# Patient Record
Sex: FEMALE | Race: WHITE | NOT HISPANIC OR LATINO | Employment: OTHER | URBAN - METROPOLITAN AREA
[De-identification: names, ages, dates, MRNs, and addresses within clinical notes are randomized per-mention and may not be internally consistent; named-entity substitution may affect disease eponyms.]

---

## 2018-01-30 NOTE — PATIENT DISCUSSION
Based on the patient’s desires, recommend patient select the Symfony IOL using laser-assisted surgery and astigmatism treatment after the risks and benefits were discussed, including decreased vision and glare in low light, and may still need glasses for some activities.

## 2018-01-30 NOTE — PATIENT DISCUSSION
Patient's sister had advance iol on [address] and had an iol exchange due to trouble with advance iol.

## 2018-08-15 ENCOUNTER — RETINA CONSULT (OUTPATIENT)
Dept: URBAN - METROPOLITAN AREA CLINIC 39 | Facility: CLINIC | Age: 83
End: 2018-08-15

## 2018-08-15 DIAGNOSIS — H43.813: ICD-10-CM

## 2018-08-15 DIAGNOSIS — H35.3122: ICD-10-CM

## 2018-08-15 DIAGNOSIS — H35.3211: ICD-10-CM

## 2018-08-15 DIAGNOSIS — E11.9: ICD-10-CM

## 2018-08-15 DIAGNOSIS — H35.722: ICD-10-CM

## 2018-08-15 DIAGNOSIS — H35.731: ICD-10-CM

## 2018-08-15 PROCEDURE — 2022F DILAT RTA XM EVC RTNOPTHY: CPT

## 2018-08-15 PROCEDURE — 2019F DILATED MACUL EXAM DONE: CPT

## 2018-08-15 PROCEDURE — G9903 PT SCRN TBCO ID AS NON USER: HCPCS

## 2018-08-15 PROCEDURE — 67028 INJECTION EYE DRUG: CPT

## 2018-08-15 PROCEDURE — 4040F PNEUMOC VAC/ADMIN/RCVD: CPT

## 2018-08-15 PROCEDURE — 92242 FLUORESCEIN&ICG ANGIOGRAPHY: CPT

## 2018-08-15 PROCEDURE — 99204 OFFICE O/P NEW MOD 45 MIN: CPT

## 2018-08-15 PROCEDURE — 3072F LOW RISK FOR RETINOPATHY: CPT

## 2018-08-15 PROCEDURE — 9222550 BILAT EXTENDED OPHTHALMOSCOPY, FIRST

## 2018-08-15 PROCEDURE — G8482 FLU IMMUNIZE ORDER/ADMIN: HCPCS

## 2018-08-15 PROCEDURE — 4177F TALK PT/CRGVR RE AREDS PREV: CPT

## 2018-08-15 PROCEDURE — 92134 CPTRZ OPH DX IMG PST SGM RTA: CPT

## 2018-08-15 PROCEDURE — G8756 NO BP MEASURE DOC: HCPCS

## 2018-08-15 PROCEDURE — G8427 DOCREV CUR MEDS BY ELIG CLIN: HCPCS

## 2018-08-15 PROCEDURE — 1036F TOBACCO NON-USER: CPT

## 2018-08-15 ASSESSMENT — VISUAL ACUITY
OS_PH: 20/200
OD_SC: 20/60
OS_SC: 20/400

## 2018-08-15 ASSESSMENT — TONOMETRY
OS_IOP_MMHG: 8
OD_IOP_MMHG: 10

## 2018-09-13 ENCOUNTER — RETINA CONSULT (OUTPATIENT)
Dept: URBAN - METROPOLITAN AREA CLINIC 35 | Facility: CLINIC | Age: 83
End: 2018-09-13

## 2018-09-13 DIAGNOSIS — H43.813: ICD-10-CM

## 2018-09-13 DIAGNOSIS — H35.722: ICD-10-CM

## 2018-09-13 DIAGNOSIS — H35.3211: ICD-10-CM

## 2018-09-13 DIAGNOSIS — H35.731: ICD-10-CM

## 2018-09-13 DIAGNOSIS — H35.3122: ICD-10-CM

## 2018-09-13 DIAGNOSIS — E11.9: ICD-10-CM

## 2018-09-13 PROCEDURE — 4177F TALK PT/CRGVR RE AREDS PREV: CPT

## 2018-09-13 PROCEDURE — 9222650 BILAT EXTENDED OPHTHALMOSCOPY, F/U

## 2018-09-13 PROCEDURE — G9974 MAC EXAM PERF: HCPCS

## 2018-09-13 PROCEDURE — G9903 PT SCRN TBCO ID AS NON USER: HCPCS

## 2018-09-13 PROCEDURE — 2019F DILATED MACUL EXAM DONE: CPT

## 2018-09-13 PROCEDURE — 3072F LOW RISK FOR RETINOPATHY: CPT

## 2018-09-13 PROCEDURE — 1036F TOBACCO NON-USER: CPT

## 2018-09-13 PROCEDURE — 2022F DILAT RTA XM EVC RTNOPTHY: CPT

## 2018-09-13 PROCEDURE — G8756 NO BP MEASURE DOC: HCPCS

## 2018-09-13 PROCEDURE — G8427 DOCREV CUR MEDS BY ELIG CLIN: HCPCS

## 2018-09-13 PROCEDURE — 92134 CPTRZ OPH DX IMG PST SGM RTA: CPT

## 2018-09-13 PROCEDURE — 67028 INJECTION EYE DRUG: CPT

## 2018-09-13 PROCEDURE — 92012 INTRM OPH EXAM EST PATIENT: CPT

## 2018-09-13 ASSESSMENT — VISUAL ACUITY
OS_SC: CF 5FT
OD_SC: 20/60
OS_PH: 20/400

## 2018-09-13 ASSESSMENT — TONOMETRY
OD_IOP_MMHG: 12
OS_IOP_MMHG: 12

## 2018-10-25 ENCOUNTER — ESTABLISHED PATIENT (OUTPATIENT)
Dept: URBAN - METROPOLITAN AREA CLINIC 35 | Facility: CLINIC | Age: 83
End: 2018-10-25

## 2018-10-25 DIAGNOSIS — H35.731: ICD-10-CM

## 2018-10-25 DIAGNOSIS — E11.9: ICD-10-CM

## 2018-10-25 DIAGNOSIS — H43.813: ICD-10-CM

## 2018-10-25 DIAGNOSIS — H35.3211: ICD-10-CM

## 2018-10-25 DIAGNOSIS — H35.722: ICD-10-CM

## 2018-10-25 DIAGNOSIS — H35.3122: ICD-10-CM

## 2018-10-25 PROCEDURE — 2022F DILAT RTA XM EVC RTNOPTHY: CPT

## 2018-10-25 PROCEDURE — G8482 FLU IMMUNIZE ORDER/ADMIN: HCPCS

## 2018-10-25 PROCEDURE — 2019F DILATED MACUL EXAM DONE: CPT

## 2018-10-25 PROCEDURE — 92134 CPTRZ OPH DX IMG PST SGM RTA: CPT

## 2018-10-25 PROCEDURE — 99214 OFFICE O/P EST MOD 30 MIN: CPT

## 2018-10-25 PROCEDURE — G8428 CUR MEDS NOT DOCUMENT: HCPCS

## 2018-10-25 PROCEDURE — G8756 NO BP MEASURE DOC: HCPCS

## 2018-10-25 PROCEDURE — 4177F TALK PT/CRGVR RE AREDS PREV: CPT

## 2018-10-25 PROCEDURE — G9903 PT SCRN TBCO ID AS NON USER: HCPCS

## 2018-10-25 PROCEDURE — 1036F TOBACCO NON-USER: CPT

## 2018-10-25 PROCEDURE — 67028 INJECTION EYE DRUG: CPT

## 2018-10-25 PROCEDURE — 4040F PNEUMOC VAC/ADMIN/RCVD: CPT

## 2018-10-25 PROCEDURE — 3072F LOW RISK FOR RETINOPATHY: CPT

## 2018-10-25 ASSESSMENT — VISUAL ACUITY
OS_PH: 20/400
OS_SC: CF 5FT
OD_SC: 20/50-2

## 2018-10-25 ASSESSMENT — TONOMETRY
OD_IOP_MMHG: 14
OS_IOP_MMHG: 13

## 2018-12-20 ENCOUNTER — ESTABLISHED PATIENT (OUTPATIENT)
Dept: URBAN - METROPOLITAN AREA CLINIC 35 | Facility: CLINIC | Age: 83
End: 2018-12-20

## 2018-12-20 DIAGNOSIS — H35.3122: ICD-10-CM

## 2018-12-20 DIAGNOSIS — H35.731: ICD-10-CM

## 2018-12-20 DIAGNOSIS — H43.813: ICD-10-CM

## 2018-12-20 DIAGNOSIS — H35.722: ICD-10-CM

## 2018-12-20 DIAGNOSIS — H35.3211: ICD-10-CM

## 2018-12-20 DIAGNOSIS — E11.9: ICD-10-CM

## 2018-12-20 PROCEDURE — 92235 FLUORESCEIN ANGRPH MLTIFRAME: CPT

## 2018-12-20 PROCEDURE — 92250 FUNDUS PHOTOGRAPHY W/I&R: CPT

## 2018-12-20 PROCEDURE — 3072F LOW RISK FOR RETINOPATHY: CPT

## 2018-12-20 PROCEDURE — 4177F TALK PT/CRGVR RE AREDS PREV: CPT

## 2018-12-20 PROCEDURE — 92012 INTRM OPH EXAM EST PATIENT: CPT

## 2018-12-20 PROCEDURE — G9903 PT SCRN TBCO ID AS NON USER: HCPCS

## 2018-12-20 PROCEDURE — 92134 CPTRZ OPH DX IMG PST SGM RTA: CPT

## 2018-12-20 PROCEDURE — 1036F TOBACCO NON-USER: CPT

## 2018-12-20 PROCEDURE — G8428 CUR MEDS NOT DOCUMENT: HCPCS

## 2018-12-20 PROCEDURE — 67028 INJECTION EYE DRUG: CPT

## 2018-12-20 PROCEDURE — G8756 NO BP MEASURE DOC: HCPCS

## 2018-12-20 PROCEDURE — 2022F DILAT RTA XM EVC RTNOPTHY: CPT

## 2018-12-20 PROCEDURE — 2019F DILATED MACUL EXAM DONE: CPT

## 2018-12-20 ASSESSMENT — TONOMETRY
OS_IOP_MMHG: 12
OD_IOP_MMHG: 13

## 2018-12-20 ASSESSMENT — VISUAL ACUITY
OD_SC: 20/50+2
OS_SC: CF 5FT

## 2019-01-31 ENCOUNTER — ESTABLISHED PATIENT (OUTPATIENT)
Dept: URBAN - METROPOLITAN AREA CLINIC 35 | Facility: CLINIC | Age: 84
End: 2019-01-31

## 2019-01-31 DIAGNOSIS — H35.722: ICD-10-CM

## 2019-01-31 DIAGNOSIS — H35.731: ICD-10-CM

## 2019-01-31 DIAGNOSIS — H35.3122: ICD-10-CM

## 2019-01-31 DIAGNOSIS — H35.3211: ICD-10-CM

## 2019-01-31 PROCEDURE — 67028 INJECTION EYE DRUG: CPT

## 2019-01-31 PROCEDURE — 92134 CPTRZ OPH DX IMG PST SGM RTA: CPT

## 2019-01-31 PROCEDURE — 92012 INTRM OPH EXAM EST PATIENT: CPT

## 2019-01-31 ASSESSMENT — VISUAL ACUITY
OS_SC: CF 5FT
OD_SC: 20/60-2

## 2019-01-31 ASSESSMENT — TONOMETRY
OD_IOP_MMHG: 14
OS_IOP_MMHG: 11

## 2019-03-21 ENCOUNTER — ESTABLISHED PATIENT (OUTPATIENT)
Dept: URBAN - METROPOLITAN AREA CLINIC 35 | Facility: CLINIC | Age: 84
End: 2019-03-21

## 2019-03-21 DIAGNOSIS — H35.722: ICD-10-CM

## 2019-03-21 DIAGNOSIS — H35.3122: ICD-10-CM

## 2019-03-21 DIAGNOSIS — H35.731: ICD-10-CM

## 2019-03-21 DIAGNOSIS — H35.3211: ICD-10-CM

## 2019-03-21 PROCEDURE — 92250 FUNDUS PHOTOGRAPHY W/I&R: CPT

## 2019-03-21 PROCEDURE — 92012 INTRM OPH EXAM EST PATIENT: CPT

## 2019-03-21 PROCEDURE — 67028 INJECTION EYE DRUG: CPT

## 2019-03-21 PROCEDURE — 92235 FLUORESCEIN ANGRPH MLTIFRAME: CPT

## 2019-03-21 PROCEDURE — 9222650 BILAT EXTENDED OPHTHALMOSCOPY, F/U

## 2019-03-21 ASSESSMENT — TONOMETRY
OD_IOP_MMHG: 10
OS_IOP_MMHG: 10

## 2019-03-21 ASSESSMENT — VISUAL ACUITY: OD_SC: 20/60-2

## 2019-05-16 ENCOUNTER — ESTABLISHED PATIENT (OUTPATIENT)
Dept: URBAN - METROPOLITAN AREA CLINIC 35 | Facility: CLINIC | Age: 84
End: 2019-05-16

## 2019-05-16 DIAGNOSIS — H35.731: ICD-10-CM

## 2019-05-16 DIAGNOSIS — H35.3122: ICD-10-CM

## 2019-05-16 DIAGNOSIS — H35.3211: ICD-10-CM

## 2019-05-16 DIAGNOSIS — H35.722: ICD-10-CM

## 2019-05-16 PROCEDURE — 92250 FUNDUS PHOTOGRAPHY W/I&R: CPT

## 2019-05-16 PROCEDURE — 67028 INJECTION EYE DRUG: CPT

## 2019-05-16 PROCEDURE — 92012 INTRM OPH EXAM EST PATIENT: CPT

## 2019-05-16 PROCEDURE — 92235 FLUORESCEIN ANGRPH MLTIFRAME: CPT

## 2019-05-16 ASSESSMENT — TONOMETRY
OS_IOP_MMHG: 13
OD_IOP_MMHG: 13

## 2019-05-16 ASSESSMENT — VISUAL ACUITY
OD_SC: 20/70+2
OS_SC: CF 5FT

## 2019-05-21 ENCOUNTER — ESTABLISHED COMPREHENSIVE EXAM (OUTPATIENT)
Dept: URBAN - METROPOLITAN AREA CLINIC 35 | Facility: CLINIC | Age: 84
End: 2019-05-21

## 2019-05-21 DIAGNOSIS — H35.30: ICD-10-CM

## 2019-05-21 DIAGNOSIS — H43.813: ICD-10-CM

## 2019-05-21 DIAGNOSIS — H35.731: ICD-10-CM

## 2019-05-21 DIAGNOSIS — E11.9: ICD-10-CM

## 2019-05-21 DIAGNOSIS — H35.3122: ICD-10-CM

## 2019-05-21 DIAGNOSIS — H35.722: ICD-10-CM

## 2019-05-21 DIAGNOSIS — H35.3211: ICD-10-CM

## 2019-05-21 DIAGNOSIS — H52.7: ICD-10-CM

## 2019-05-21 DIAGNOSIS — H04.123: ICD-10-CM

## 2019-05-21 PROCEDURE — 92014 COMPRE OPH EXAM EST PT 1/>: CPT

## 2019-05-21 PROCEDURE — 92015 DETERMINE REFRACTIVE STATE: CPT

## 2019-05-21 ASSESSMENT — TONOMETRY
OS_IOP_MMHG: 10
OD_IOP_MMHG: 13

## 2019-05-21 ASSESSMENT — VISUAL ACUITY
OD_CC: J12+
OS_CC: J12-
OD_CC: 20/50-+
OS_SC: 20/400-
OD_SC: 20/60-1
OS_CC: 20/400-

## 2019-07-11 ENCOUNTER — ESTABLISHED PATIENT (OUTPATIENT)
Dept: URBAN - METROPOLITAN AREA CLINIC 35 | Facility: CLINIC | Age: 84
End: 2019-07-11

## 2019-07-11 DIAGNOSIS — E11.9: ICD-10-CM

## 2019-07-11 DIAGNOSIS — H35.722: ICD-10-CM

## 2019-07-11 DIAGNOSIS — H35.3211: ICD-10-CM

## 2019-07-11 DIAGNOSIS — H35.3122: ICD-10-CM

## 2019-07-11 DIAGNOSIS — H43.813: ICD-10-CM

## 2019-07-11 DIAGNOSIS — H35.731: ICD-10-CM

## 2019-07-11 PROCEDURE — 92235 FLUORESCEIN ANGRPH MLTIFRAME: CPT

## 2019-07-11 PROCEDURE — 67028 INJECTION EYE DRUG: CPT

## 2019-07-11 PROCEDURE — 92012 INTRM OPH EXAM EST PATIENT: CPT

## 2019-07-11 PROCEDURE — 92134 CPTRZ OPH DX IMG PST SGM RTA: CPT

## 2019-07-11 PROCEDURE — 9222650 BILAT EXTENDED OPHTHALMOSCOPY, F/U

## 2019-07-11 PROCEDURE — 92250 FUNDUS PHOTOGRAPHY W/I&R: CPT

## 2019-07-11 ASSESSMENT — VISUAL ACUITY
OD_SC: 20/100-1
OS_PH: 20/200
OS_SC: CF 4FT

## 2019-07-11 ASSESSMENT — TONOMETRY
OS_IOP_MMHG: 12
OD_IOP_MMHG: 13

## 2019-08-15 ENCOUNTER — ESTABLISHED PATIENT (OUTPATIENT)
Dept: URBAN - METROPOLITAN AREA CLINIC 35 | Facility: CLINIC | Age: 84
End: 2019-08-15

## 2019-08-15 DIAGNOSIS — H35.3211: ICD-10-CM

## 2019-08-15 DIAGNOSIS — H35.722: ICD-10-CM

## 2019-08-15 DIAGNOSIS — H43.813: ICD-10-CM

## 2019-08-15 DIAGNOSIS — H35.3122: ICD-10-CM

## 2019-08-15 DIAGNOSIS — H35.731: ICD-10-CM

## 2019-08-15 DIAGNOSIS — E11.9: ICD-10-CM

## 2019-08-15 PROCEDURE — 92012 INTRM OPH EXAM EST PATIENT: CPT

## 2019-08-15 PROCEDURE — 67028 INJECTION EYE DRUG: CPT

## 2019-08-15 PROCEDURE — 9222650 BILAT EXTENDED OPHTHALMOSCOPY, F/U

## 2019-08-15 PROCEDURE — 92134 CPTRZ OPH DX IMG PST SGM RTA: CPT

## 2019-08-15 ASSESSMENT — TONOMETRY
OS_IOP_MMHG: 10
OD_IOP_MMHG: 14

## 2019-08-15 ASSESSMENT — VISUAL ACUITY
OS_SC: 20/400
OD_SC: 20/80

## 2019-10-03 ENCOUNTER — ESTABLISHED PATIENT (OUTPATIENT)
Dept: URBAN - METROPOLITAN AREA CLINIC 35 | Facility: CLINIC | Age: 84
End: 2019-10-03

## 2019-10-03 DIAGNOSIS — E11.9: ICD-10-CM

## 2019-10-03 DIAGNOSIS — H35.3211: ICD-10-CM

## 2019-10-03 DIAGNOSIS — H35.731: ICD-10-CM

## 2019-10-03 DIAGNOSIS — H35.3122: ICD-10-CM

## 2019-10-03 DIAGNOSIS — H35.722: ICD-10-CM

## 2019-10-03 DIAGNOSIS — H43.813: ICD-10-CM

## 2019-10-03 PROCEDURE — 92012 INTRM OPH EXAM EST PATIENT: CPT

## 2019-10-03 PROCEDURE — 92250 FUNDUS PHOTOGRAPHY W/I&R: CPT

## 2019-10-03 PROCEDURE — 92134 CPTRZ OPH DX IMG PST SGM RTA: CPT

## 2019-10-03 PROCEDURE — 92235 FLUORESCEIN ANGRPH MLTIFRAME: CPT

## 2019-10-03 PROCEDURE — 67028 INJECTION EYE DRUG: CPT

## 2019-10-03 ASSESSMENT — VISUAL ACUITY
OS_SC: 20/200-1
OD_SC: 20/80

## 2019-10-03 ASSESSMENT — TONOMETRY
OS_IOP_MMHG: 9
OD_IOP_MMHG: 11

## 2019-11-21 ENCOUNTER — ESTABLISHED PATIENT (OUTPATIENT)
Dept: URBAN - METROPOLITAN AREA CLINIC 35 | Facility: CLINIC | Age: 84
End: 2019-11-21

## 2019-11-21 DIAGNOSIS — H43.813: ICD-10-CM

## 2019-11-21 DIAGNOSIS — H35.3122: ICD-10-CM

## 2019-11-21 DIAGNOSIS — H35.033: ICD-10-CM

## 2019-11-21 DIAGNOSIS — H35.731: ICD-10-CM

## 2019-11-21 DIAGNOSIS — H35.722: ICD-10-CM

## 2019-11-21 DIAGNOSIS — E11.9: ICD-10-CM

## 2019-11-21 DIAGNOSIS — H35.3211: ICD-10-CM

## 2019-11-21 PROCEDURE — 9222650 BILAT EXTENDED OPHTHALMOSCOPY, F/U

## 2019-11-21 PROCEDURE — 67028 INJECTION EYE DRUG: CPT

## 2019-11-21 PROCEDURE — 92012 INTRM OPH EXAM EST PATIENT: CPT

## 2019-11-21 PROCEDURE — 92134 CPTRZ OPH DX IMG PST SGM RTA: CPT

## 2019-11-21 ASSESSMENT — TONOMETRY
OS_IOP_MMHG: 13
OD_IOP_MMHG: 12

## 2019-11-21 ASSESSMENT — VISUAL ACUITY
OD_SC: 20/80
OS_SC: 20/200-1

## 2019-11-25 NOTE — PATIENT DISCUSSION
Patient informed of available non-opioid medications and other non-pharmacological options. Discussed advantages and disadvantages of the alternatives, including whether the patient is at-risk for, or has a history of, controlled substance abuse or misuse, and the patient’s personal preferences. [facility] “Opioid Alternative Pamphlet” was provided to patient.

## 2019-12-05 NOTE — PATIENT DISCUSSION
Patient informed of available non-opioid medications and other non-pharmacological options. Discussed advantages and disadvantages of the alternatives, including whether the patient is at-risk for, or has a history of, controlled substance abuse or misuse, and the patient’s personal preferences. [facility] “Opioid Alternative Pamphlet” was provided to patient.

## 2019-12-05 NOTE — PROCEDURE NOTE: SURGICAL
<p>Prior to commencing surgery patient identification, surgical procedure, site, and side were confirmed by Dr. Allie Mock. Following topical proparacaine anesthesia, the patient was positioned at the YAG laser, a contact lens coupled to the cornea with methylcellulose and an axial posterior capsulotomy performed without complication using 3.0 Mj x 45. Excess methylcellulose was washed from the eye, one drop of Alphagan was instilled and the patient returned to the holding area having tolerated the procedure well and without complication. </p><p>MRN:[id number]</p>

## 2019-12-06 NOTE — PATIENT DISCUSSION
Patient informed of available non-opioid medications and other non-pharmacological options. Discussed advantages and disadvantages of the alternatives, including whether the patient is at-risk for, or has a history of, controlled substance abuse or misuse, and the patient’s personal preferences. 516 [facility] “Opioid Alternative Pamphlet” was provided to patient.

## 2020-01-17 NOTE — PATIENT DISCUSSION
Patient informed of available non-opioid medications and other non-pharmacological options. Discussed advantages and disadvantages of the alternatives, including whether the patient is at-risk for, or has a history of, controlled substance abuse or misuse, and the patient’s personal preferences. 516 [facility] “Opioid Alternative Pamphlet” was provided to patient.

## 2020-01-23 ENCOUNTER — ESTABLISHED PATIENT (OUTPATIENT)
Dept: URBAN - METROPOLITAN AREA CLINIC 35 | Facility: CLINIC | Age: 85
End: 2020-01-23

## 2020-01-23 DIAGNOSIS — H43.813: ICD-10-CM

## 2020-01-23 DIAGNOSIS — H35.722: ICD-10-CM

## 2020-01-23 DIAGNOSIS — H35.731: ICD-10-CM

## 2020-01-23 DIAGNOSIS — H35.3122: ICD-10-CM

## 2020-01-23 DIAGNOSIS — E11.9: ICD-10-CM

## 2020-01-23 DIAGNOSIS — H35.033: ICD-10-CM

## 2020-01-23 DIAGNOSIS — H35.3211: ICD-10-CM

## 2020-01-23 PROCEDURE — 92012 INTRM OPH EXAM EST PATIENT: CPT

## 2020-01-23 PROCEDURE — 92134 CPTRZ OPH DX IMG PST SGM RTA: CPT

## 2020-01-23 PROCEDURE — 67028 INJECTION EYE DRUG: CPT

## 2020-01-23 ASSESSMENT — TONOMETRY
OS_IOP_MMHG: 14
OD_IOP_MMHG: 14

## 2020-01-23 ASSESSMENT — VISUAL ACUITY
OS_SC: 20/400
OD_SC: 20/80-1

## 2020-01-24 NOTE — PATIENT DISCUSSION
Patient informed of available non-opioid medications and other non-pharmacological options. Discussed advantages and disadvantages of the alternatives, including whether the patient is at-risk for, or has a history of, controlled substance abuse or misuse, and the patient’s personal preferences. 516 [facility] “Opioid Alternative Pamphlet” was provided to patient.

## 2020-02-17 NOTE — PATIENT DISCUSSION
Patient informed of available non-opioid medications and other non-pharmacological options. Discussed advantages and disadvantages of the alternatives, including whether the patient is at-risk for, or has a history of, controlled substance abuse or misuse, and the patient’s personal preferences. 516 [facility] “Opioid Alternative Pamphlet” was provided to patient.

## 2020-03-19 ENCOUNTER — ESTABLISHED PATIENT (OUTPATIENT)
Dept: URBAN - METROPOLITAN AREA CLINIC 35 | Facility: CLINIC | Age: 85
End: 2020-03-19

## 2020-03-19 DIAGNOSIS — H35.722: ICD-10-CM

## 2020-03-19 DIAGNOSIS — H35.3211: ICD-10-CM

## 2020-03-19 DIAGNOSIS — H43.813: ICD-10-CM

## 2020-03-19 DIAGNOSIS — E11.9: ICD-10-CM

## 2020-03-19 DIAGNOSIS — H35.3122: ICD-10-CM

## 2020-03-19 DIAGNOSIS — H35.033: ICD-10-CM

## 2020-03-19 DIAGNOSIS — H35.731: ICD-10-CM

## 2020-03-19 PROCEDURE — 67028 INJECTION EYE DRUG: CPT

## 2020-03-19 PROCEDURE — J0178PRE EYLEA PREFILLED SYRINGE

## 2020-03-19 PROCEDURE — 92014 COMPRE OPH EXAM EST PT 1/>: CPT

## 2020-03-19 PROCEDURE — 92250 FUNDUS PHOTOGRAPHY W/I&R: CPT

## 2020-03-19 PROCEDURE — 92235 FLUORESCEIN ANGRPH MLTIFRAME: CPT

## 2020-03-19 ASSESSMENT — VISUAL ACUITY
OD_SC: 20/200+1
OS_SC: 20/400

## 2020-03-19 ASSESSMENT — TONOMETRY
OD_IOP_MMHG: 14
OS_IOP_MMHG: 12

## 2020-04-15 NOTE — PATIENT DISCUSSION
Patient informed of available non-opioid medications and other non-pharmacological options. Discussed advantages and disadvantages of the alternatives, including whether the patient is at-risk for, or has a history of, controlled substance abuse or misuse, and the patient’s personal preferences. 516 [facility] “Opioid Alternative Pamphlet” was provided to patient.

## 2020-05-14 ENCOUNTER — ESTABLISHED PATIENT (OUTPATIENT)
Dept: URBAN - METROPOLITAN AREA CLINIC 35 | Facility: CLINIC | Age: 85
End: 2020-05-14

## 2020-05-14 DIAGNOSIS — H43.813: ICD-10-CM

## 2020-05-14 DIAGNOSIS — H35.3122: ICD-10-CM

## 2020-05-14 DIAGNOSIS — H35.731: ICD-10-CM

## 2020-05-14 DIAGNOSIS — H35.033: ICD-10-CM

## 2020-05-14 DIAGNOSIS — E11.9: ICD-10-CM

## 2020-05-14 DIAGNOSIS — H35.722: ICD-10-CM

## 2020-05-14 DIAGNOSIS — H35.3211: ICD-10-CM

## 2020-05-14 PROCEDURE — J0178PRE EYLEA PREFILLED SYRINGE

## 2020-05-14 PROCEDURE — 92235 FLUORESCEIN ANGRPH MLTIFRAME: CPT

## 2020-05-14 PROCEDURE — 67028 INJECTION EYE DRUG: CPT

## 2020-05-14 PROCEDURE — 92250 FUNDUS PHOTOGRAPHY W/I&R: CPT

## 2020-05-14 PROCEDURE — 92014 COMPRE OPH EXAM EST PT 1/>: CPT

## 2020-05-14 ASSESSMENT — VISUAL ACUITY
OD_SC: 20/100-1
OS_SC: 20/400
OD_PH: 20/100

## 2020-05-26 NOTE — PATIENT DISCUSSION
Patient informed of available non-opioid medications and other non-pharmacological options. Discussed advantages and disadvantages of the alternatives, including whether the patient is at-risk for, or has a history of, controlled substance abuse or misuse, and the patient’s personal preferences. 516 [facility] “Opioid Alternative Pamphlet” was provided to patient.

## 2020-07-16 ENCOUNTER — ESTABLISHED PATIENT (OUTPATIENT)
Dept: URBAN - METROPOLITAN AREA CLINIC 35 | Facility: CLINIC | Age: 85
End: 2020-07-16

## 2020-07-16 DIAGNOSIS — H35.3211: ICD-10-CM

## 2020-07-16 DIAGNOSIS — H35.722: ICD-10-CM

## 2020-07-16 DIAGNOSIS — H43.813: ICD-10-CM

## 2020-07-16 DIAGNOSIS — H35.3122: ICD-10-CM

## 2020-07-16 DIAGNOSIS — H35.363: ICD-10-CM

## 2020-07-16 DIAGNOSIS — H35.731: ICD-10-CM

## 2020-07-16 DIAGNOSIS — H35.033: ICD-10-CM

## 2020-07-16 DIAGNOSIS — E11.9: ICD-10-CM

## 2020-07-16 PROCEDURE — J0178PRE EYLEA PREFILLED SYRINGE

## 2020-07-16 PROCEDURE — 92014 COMPRE OPH EXAM EST PT 1/>: CPT

## 2020-07-16 PROCEDURE — 92235 FLUORESCEIN ANGRPH MLTIFRAME: CPT

## 2020-07-16 PROCEDURE — 67028 INJECTION EYE DRUG: CPT

## 2020-07-16 PROCEDURE — 92250 FUNDUS PHOTOGRAPHY W/I&R: CPT

## 2020-07-16 ASSESSMENT — TONOMETRY
OS_IOP_MMHG: 16
OD_IOP_MMHG: 17

## 2020-07-16 ASSESSMENT — VISUAL ACUITY
OD_SC: 20/100
OS_PH: 20/200
OS_SC: 20/400

## 2020-09-10 ENCOUNTER — ESTABLISHED PATIENT (OUTPATIENT)
Dept: URBAN - METROPOLITAN AREA CLINIC 35 | Facility: CLINIC | Age: 85
End: 2020-09-10

## 2020-09-10 DIAGNOSIS — H35.722: ICD-10-CM

## 2020-09-10 DIAGNOSIS — H43.813: ICD-10-CM

## 2020-09-10 DIAGNOSIS — H35.3122: ICD-10-CM

## 2020-09-10 DIAGNOSIS — H35.731: ICD-10-CM

## 2020-09-10 DIAGNOSIS — E11.9: ICD-10-CM

## 2020-09-10 DIAGNOSIS — H35.363: ICD-10-CM

## 2020-09-10 DIAGNOSIS — H35.033: ICD-10-CM

## 2020-09-10 DIAGNOSIS — H35.3211: ICD-10-CM

## 2020-09-10 PROCEDURE — 92235 FLUORESCEIN ANGRPH MLTIFRAME: CPT

## 2020-09-10 PROCEDURE — 92014 COMPRE OPH EXAM EST PT 1/>: CPT

## 2020-09-10 PROCEDURE — 92250 FUNDUS PHOTOGRAPHY W/I&R: CPT

## 2020-09-10 PROCEDURE — J0178PRE EYLEA PREFILLED SYRINGE

## 2020-09-10 PROCEDURE — 67028 INJECTION EYE DRUG: CPT

## 2020-09-10 ASSESSMENT — VISUAL ACUITY
OD_SC: 20/200-1
OS_SC: 20/400
OS_PH: 20/200

## 2020-09-10 ASSESSMENT — TONOMETRY
OD_IOP_MMHG: 15
OS_IOP_MMHG: 16

## 2020-11-05 ENCOUNTER — ESTABLISHED PATIENT (OUTPATIENT)
Dept: URBAN - METROPOLITAN AREA CLINIC 35 | Facility: CLINIC | Age: 85
End: 2020-11-05

## 2020-11-05 DIAGNOSIS — H35.3211: ICD-10-CM

## 2020-11-05 DIAGNOSIS — H35.731: ICD-10-CM

## 2020-11-05 DIAGNOSIS — H35.3122: ICD-10-CM

## 2020-11-05 DIAGNOSIS — H35.722: ICD-10-CM

## 2020-11-05 PROCEDURE — 67028 INJECTION EYE DRUG: CPT

## 2020-11-05 PROCEDURE — 92242 FLUORESCEIN&ICG ANGIOGRAPHY: CPT

## 2020-11-05 PROCEDURE — 92202 OPSCPY EXTND ON/MAC DRAW: CPT

## 2020-11-05 PROCEDURE — 92012 INTRM OPH EXAM EST PATIENT: CPT

## 2020-11-05 PROCEDURE — J0178PRE EYLEA PREFILLED SYRINGE

## 2020-11-05 PROCEDURE — 92134 CPTRZ OPH DX IMG PST SGM RTA: CPT

## 2020-11-05 ASSESSMENT — TONOMETRY
OD_IOP_MMHG: 13
OS_IOP_MMHG: 11

## 2020-11-05 ASSESSMENT — VISUAL ACUITY
OS_SC: 20/200-1
OD_SC: 20/400

## 2020-12-08 ENCOUNTER — REFRACTION ONLY (OUTPATIENT)
Dept: URBAN - METROPOLITAN AREA CLINIC 35 | Facility: CLINIC | Age: 85
End: 2020-12-08

## 2020-12-08 DIAGNOSIS — H35.3122: ICD-10-CM

## 2020-12-08 DIAGNOSIS — H35.3211: ICD-10-CM

## 2020-12-08 DIAGNOSIS — Z96.1: ICD-10-CM

## 2020-12-08 DIAGNOSIS — H35.722: ICD-10-CM

## 2020-12-08 DIAGNOSIS — H52.7: ICD-10-CM

## 2020-12-08 DIAGNOSIS — H35.363: ICD-10-CM

## 2020-12-08 DIAGNOSIS — H35.033: ICD-10-CM

## 2020-12-08 DIAGNOSIS — H35.30: ICD-10-CM

## 2020-12-08 DIAGNOSIS — H04.123: ICD-10-CM

## 2020-12-08 DIAGNOSIS — H35.731: ICD-10-CM

## 2020-12-08 DIAGNOSIS — E11.9: ICD-10-CM

## 2020-12-08 DIAGNOSIS — H43.813: ICD-10-CM

## 2020-12-08 PROCEDURE — 92015 DETERMINE REFRACTIVE STATE: CPT

## 2020-12-08 PROCEDURE — 92012 INTRM OPH EXAM EST PATIENT: CPT

## 2020-12-08 ASSESSMENT — VISUAL ACUITY
OD_SC: 20/400
OS_SC: 20/200

## 2021-01-18 NOTE — PROCEDURE NOTE: CLINICAL
PROCEDURE NOTE: Punctal Plugs, Silicone Bilateral Lower Lids. Diagnosis: Dry Eye Syndrome. Anesthesia: Topical. Prior to treatment, the risks/benefits/alternatives were discussed. The patient wished to proceed with procedure. Permanent silicone plugs were inserted. Size/location of plugs inserted: 0.6. Patient tolerated procedure well. There were no complications. Post procedure instructions given. Misty Michelle

## 2021-01-21 ENCOUNTER — ESTABLISHED PATIENT (OUTPATIENT)
Dept: URBAN - METROPOLITAN AREA CLINIC 35 | Facility: CLINIC | Age: 86
End: 2021-01-21

## 2021-01-21 DIAGNOSIS — H35.033: ICD-10-CM

## 2021-01-21 DIAGNOSIS — H35.3211: ICD-10-CM

## 2021-01-21 DIAGNOSIS — E11.9: ICD-10-CM

## 2021-01-21 DIAGNOSIS — H35.30: ICD-10-CM

## 2021-01-21 DIAGNOSIS — H35.363: ICD-10-CM

## 2021-01-21 DIAGNOSIS — H35.722: ICD-10-CM

## 2021-01-21 DIAGNOSIS — H35.731: ICD-10-CM

## 2021-01-21 DIAGNOSIS — H35.3122: ICD-10-CM

## 2021-01-21 DIAGNOSIS — H43.813: ICD-10-CM

## 2021-01-21 PROCEDURE — 99213 OFFICE O/P EST LOW 20 MIN: CPT

## 2021-01-21 PROCEDURE — J0178PRE EYLEA PREFILLED SYRINGE

## 2021-01-21 PROCEDURE — 92250 FUNDUS PHOTOGRAPHY W/I&R: CPT

## 2021-01-21 PROCEDURE — 67028 INJECTION EYE DRUG: CPT

## 2021-01-21 ASSESSMENT — TONOMETRY
OS_IOP_MMHG: 15
OD_IOP_MMHG: 17

## 2021-01-21 ASSESSMENT — VISUAL ACUITY
OD_PH: 20/200
OD_SC: 20/400
OS_PH: 20/200
OS_SC: 20/400

## 2021-02-08 NOTE — PATIENT DISCUSSION
currently non-active based on todays exam, advised patient this is a limiting factor for best corrected vision.

## 2021-02-08 NOTE — PATIENT DISCUSSION
rec [facility] ( EPI-LASEK ) +[facility] Goal SANJEEV OS- advised no rubbing or touching X 1 month, no swimming or dental work X 2 weeks.

## 2021-02-08 NOTE — PATIENT DISCUSSION
Patient informed of available non-opioid medications and other non-pharmacological options. Discussed advantages and disadvantages of the alternatives, including whether the patient is at-risk for, or has a history of, controlled substance abuse or misuse, and the patient’s personal preferences. 516 [facility] “Opioid Alternative Pamphlet” was provided to patient.

## 2021-02-18 NOTE — PATIENT DISCUSSION
Patient informed of available non-opioid medications and other non-pharmacological options. Discussed advantages and disadvantages of the alternatives, including whether the patient is at-risk for, or has a history of, controlled substance abuse or misuse, and the patient’s personal preferences. 516 [facility] “Opioid Alternative Pamphlet” was provided to patient.

## 2021-02-19 NOTE — PATIENT DISCUSSION
Patient informed of available non-opioid medications and other non-pharmacological options. Discussed advantages and disadvantages of the alternatives, including whether the patient is at-risk for, or has a history of, controlled substance abuse or misuse, and the patient’s personal preferences. 516 [facility] “Opioid Alternative Pamphlet” was provided to patient.

## 2021-02-26 NOTE — PATIENT DISCUSSION
Patient informed of available non-opioid medications and other non-pharmacological options. Discussed advantages and disadvantages of the alternatives, including whether the patient is at-risk for, or has a history of, controlled substance abuse or misuse, and the patient’s personal preferences. 516 [facility] “Opioid Alternative Pamphlet” was provided to patient.

## 2021-03-18 ENCOUNTER — ESTABLISHED PATIENT (OUTPATIENT)
Dept: URBAN - METROPOLITAN AREA CLINIC 35 | Facility: CLINIC | Age: 86
End: 2021-03-18

## 2021-03-18 DIAGNOSIS — H35.731: ICD-10-CM

## 2021-03-18 DIAGNOSIS — H35.3211: ICD-10-CM

## 2021-03-18 DIAGNOSIS — H35.722: ICD-10-CM

## 2021-03-18 DIAGNOSIS — H43.391: ICD-10-CM

## 2021-03-18 DIAGNOSIS — H35.3122: ICD-10-CM

## 2021-03-18 PROCEDURE — 99213 OFFICE O/P EST LOW 20 MIN: CPT

## 2021-03-18 PROCEDURE — 92134 CPTRZ OPH DX IMG PST SGM RTA: CPT

## 2021-03-18 PROCEDURE — 67028 INJECTION EYE DRUG: CPT

## 2021-03-18 PROCEDURE — J0178PRE EYLEA PREFILLED SYRINGE

## 2021-03-18 PROCEDURE — 92202 OPSCPY EXTND ON/MAC DRAW: CPT

## 2021-03-18 PROCEDURE — 92242 FLUORESCEIN&ICG ANGIOGRAPHY: CPT

## 2021-03-18 ASSESSMENT — TONOMETRY
OD_IOP_MMHG: 16
OS_IOP_MMHG: 13

## 2021-03-18 ASSESSMENT — VISUAL ACUITY
OS_PH: 20/100-1
OD_SC: 20/200
OS_SC: 20/200

## 2021-03-30 NOTE — PATIENT DISCUSSION
Patient informed of available non-opioid medications and other non-pharmacological options. Discussed advantages and disadvantages of the alternatives, including whether the patient is at-risk for, or has a history of, controlled substance abuse or misuse, and the patient’s personal preferences. 516 [facility] “Opioid Alternative Pamphlet” was provided to patient.

## 2021-04-23 NOTE — PATIENT DISCUSSION
Patient informed of available non-opioid medications and other non-pharmacological options. Discussed advantages and disadvantages of the alternatives, including whether the patient is at-risk for, or has a history of, controlled substance abuse or misuse, and the patient’s personal preferences. 516 [facility] “Opioid Alternative Pamphlet” was provided to patient.

## 2021-04-26 NOTE — PATIENT DISCUSSION
Patient informed of available non-opioid medications and other non-pharmacological options. Discussed advantages and disadvantages of the alternatives, including whether the patient is at-risk for, or has a history of, controlled substance abuse or misuse, and the patient’s personal preferences. 516 [facility] “Opioid Alternative Pamphlet” was provided to patient.

## 2021-05-13 ENCOUNTER — ESTABLISHED PATIENT (OUTPATIENT)
Dept: URBAN - METROPOLITAN AREA CLINIC 35 | Facility: CLINIC | Age: 86
End: 2021-05-13

## 2021-05-13 DIAGNOSIS — H35.3122: ICD-10-CM

## 2021-05-13 DIAGNOSIS — H35.722: ICD-10-CM

## 2021-05-13 DIAGNOSIS — H35.363: ICD-10-CM

## 2021-05-13 DIAGNOSIS — H35.3211: ICD-10-CM

## 2021-05-13 DIAGNOSIS — H43.813: ICD-10-CM

## 2021-05-13 DIAGNOSIS — E11.9: ICD-10-CM

## 2021-05-13 DIAGNOSIS — H35.731: ICD-10-CM

## 2021-05-13 DIAGNOSIS — H35.033: ICD-10-CM

## 2021-05-13 PROCEDURE — 92250 FUNDUS PHOTOGRAPHY W/I&R: CPT

## 2021-05-13 PROCEDURE — J0178PRE EYLEA PREFILLED SYRINGE

## 2021-05-13 PROCEDURE — 99213 OFFICE O/P EST LOW 20 MIN: CPT

## 2021-05-13 PROCEDURE — 67028 INJECTION EYE DRUG: CPT

## 2021-05-13 ASSESSMENT — VISUAL ACUITY
OD_SC: 20/200
OS_SC: 20/200

## 2021-05-13 ASSESSMENT — TONOMETRY
OD_IOP_MMHG: 16
OS_IOP_MMHG: 11

## 2021-07-15 ENCOUNTER — ESTABLISHED PATIENT (OUTPATIENT)
Dept: URBAN - METROPOLITAN AREA CLINIC 35 | Facility: CLINIC | Age: 86
End: 2021-07-15

## 2021-07-15 DIAGNOSIS — H35.363: ICD-10-CM

## 2021-07-15 DIAGNOSIS — E11.9: ICD-10-CM

## 2021-07-15 DIAGNOSIS — H43.813: ICD-10-CM

## 2021-07-15 DIAGNOSIS — H35.3211: ICD-10-CM

## 2021-07-15 DIAGNOSIS — H35.3122: ICD-10-CM

## 2021-07-15 DIAGNOSIS — H35.033: ICD-10-CM

## 2021-07-15 DIAGNOSIS — H35.722: ICD-10-CM

## 2021-07-15 DIAGNOSIS — H35.731: ICD-10-CM

## 2021-07-15 PROCEDURE — 99213 OFFICE O/P EST LOW 20 MIN: CPT

## 2021-07-15 PROCEDURE — 92202 OPSCPY EXTND ON/MAC DRAW: CPT

## 2021-07-15 PROCEDURE — 92242 FLUORESCEIN&ICG ANGIOGRAPHY: CPT

## 2021-07-15 PROCEDURE — J0178PRE EYLEA PREFILLED SYRINGE

## 2021-07-15 PROCEDURE — 92134 CPTRZ OPH DX IMG PST SGM RTA: CPT

## 2021-07-15 PROCEDURE — 67028 INJECTION EYE DRUG: CPT

## 2021-07-15 ASSESSMENT — VISUAL ACUITY
OD_PH: 20/200
OS_SC: 20/200
OD_SC: 20/400

## 2021-07-15 ASSESSMENT — TONOMETRY
OD_IOP_MMHG: 14
OS_IOP_MMHG: 11

## 2021-09-16 ENCOUNTER — FOLLOW UP (OUTPATIENT)
Dept: URBAN - METROPOLITAN AREA CLINIC 35 | Facility: CLINIC | Age: 86
End: 2021-09-16

## 2021-09-16 DIAGNOSIS — H35.033: ICD-10-CM

## 2021-09-16 DIAGNOSIS — H35.3211: ICD-10-CM

## 2021-09-16 DIAGNOSIS — H35.731: ICD-10-CM

## 2021-09-16 DIAGNOSIS — H35.3122: ICD-10-CM

## 2021-09-16 DIAGNOSIS — H35.363: ICD-10-CM

## 2021-09-16 DIAGNOSIS — H43.813: ICD-10-CM

## 2021-09-16 DIAGNOSIS — H35.722: ICD-10-CM

## 2021-09-16 DIAGNOSIS — E11.9: ICD-10-CM

## 2021-09-16 PROCEDURE — J0178PRE EYLEA PREFILLED SYRINGE

## 2021-09-16 PROCEDURE — 92250 FUNDUS PHOTOGRAPHY W/I&R: CPT

## 2021-09-16 PROCEDURE — 67028 INJECTION EYE DRUG: CPT

## 2021-09-16 PROCEDURE — 99213 OFFICE O/P EST LOW 20 MIN: CPT

## 2021-09-16 ASSESSMENT — VISUAL ACUITY
OS_SC: 20/200
OD_SC: 20/200

## 2021-09-16 ASSESSMENT — TONOMETRY
OD_IOP_MMHG: 15
OS_IOP_MMHG: 14

## 2021-10-05 NOTE — PATIENT DISCUSSION
Patient informed of available non-opioid medications and other non-pharmacological options. Discussed advantages and disadvantages of the alternatives, including whether the patient is at-risk for, or has a history of, controlled substance abuse or misuse, and the patient’s personal preferences. 516 [facility] “Opioid Alternative Pamphlet” was provided to patient.

## 2021-10-05 NOTE — PATIENT DISCUSSION
Patient happy with visual outcome, not perfect but happy. More bothered by SREEKANTH. Plug still present. Cequa BID OS, sample given.

## 2021-10-27 NOTE — PATIENT DISCUSSION
Patient informed of available non-opioid medications and other non-pharmacological options. Discussed advantages and disadvantages of the alternatives, including whether the patient is at-risk for, or has a history of, controlled substance abuse or misuse, and the patient’s personal preferences. 516 [facility] “Opioid Alternative Pamphlet” was provided to patient.

## 2021-12-02 ENCOUNTER — ESTABLISHED PATIENT (OUTPATIENT)
Dept: URBAN - METROPOLITAN AREA CLINIC 35 | Facility: CLINIC | Age: 86
End: 2021-12-02

## 2021-12-02 DIAGNOSIS — H35.722: ICD-10-CM

## 2021-12-02 DIAGNOSIS — H35.363: ICD-10-CM

## 2021-12-02 DIAGNOSIS — H35.731: ICD-10-CM

## 2021-12-02 DIAGNOSIS — H43.813: ICD-10-CM

## 2021-12-02 DIAGNOSIS — E11.9: ICD-10-CM

## 2021-12-02 DIAGNOSIS — H35.3122: ICD-10-CM

## 2021-12-02 DIAGNOSIS — H35.3211: ICD-10-CM

## 2021-12-02 DIAGNOSIS — H35.033: ICD-10-CM

## 2021-12-02 PROCEDURE — 92134 CPTRZ OPH DX IMG PST SGM RTA: CPT

## 2021-12-02 PROCEDURE — 99213 OFFICE O/P EST LOW 20 MIN: CPT

## 2021-12-02 PROCEDURE — J0178PRE EYLEA PREFILLED SYRINGE

## 2021-12-02 PROCEDURE — 67028 INJECTION EYE DRUG: CPT

## 2021-12-02 ASSESSMENT — VISUAL ACUITY
OD_SC: 20/200
OS_SC: 20/200

## 2021-12-02 ASSESSMENT — TONOMETRY
OD_IOP_MMHG: 14
OS_IOP_MMHG: 12

## 2022-02-24 ENCOUNTER — CLINIC PROCEDURE ONLY (OUTPATIENT)
Dept: URBAN - METROPOLITAN AREA CLINIC 35 | Facility: CLINIC | Age: 87
End: 2022-02-24

## 2022-02-24 DIAGNOSIS — H35.3211: ICD-10-CM

## 2022-02-24 PROCEDURE — 92250 FUNDUS PHOTOGRAPHY W/I&R: CPT

## 2022-02-24 PROCEDURE — 67028 INJECTION EYE DRUG: CPT

## 2022-02-24 PROCEDURE — J0178PRE EYLEA PREFILLED SYRINGE

## 2022-02-24 ASSESSMENT — VISUAL ACUITY
OD_SC: 20/200
OS_SC: 20/400+1

## 2022-02-24 ASSESSMENT — TONOMETRY
OS_IOP_MMHG: 12
OD_IOP_MMHG: 12

## 2022-04-27 NOTE — PATIENT DISCUSSION
Patient informed of available non-opioid medications and other non-pharmacological options. Discussed advantages and disadvantages of the alternatives, including whether the patient is at-risk for, or has a history of, controlled substance abuse or misuse, and the patient’s personal preferences. 516 [facility] “Opioid Alternative Pamphlet” was provided to patient.

## 2022-05-19 ENCOUNTER — ESTABLISHED PATIENT (OUTPATIENT)
Dept: URBAN - METROPOLITAN AREA CLINIC 35 | Facility: CLINIC | Age: 87
End: 2022-05-19

## 2022-05-19 DIAGNOSIS — H35.3211: ICD-10-CM

## 2022-05-19 DIAGNOSIS — H35.3122: ICD-10-CM

## 2022-05-19 PROCEDURE — J0178PRE EYLEA PREFILLED SYRINGE

## 2022-05-19 PROCEDURE — 99213 OFFICE O/P EST LOW 20 MIN: CPT

## 2022-05-19 PROCEDURE — 67028 INJECTION EYE DRUG: CPT

## 2022-05-19 PROCEDURE — 92250 FUNDUS PHOTOGRAPHY W/I&R: CPT

## 2022-05-19 ASSESSMENT — VISUAL ACUITY
OS_SC: 20/200
OD_SC: 20/200

## 2022-05-19 ASSESSMENT — TONOMETRY
OD_IOP_MMHG: 13
OS_IOP_MMHG: 12

## 2022-08-18 ENCOUNTER — ESTABLISHED PATIENT (OUTPATIENT)
Dept: URBAN - METROPOLITAN AREA CLINIC 35 | Facility: CLINIC | Age: 87
End: 2022-08-18

## 2022-08-18 DIAGNOSIS — H35.731: ICD-10-CM

## 2022-08-18 DIAGNOSIS — H35.3211: ICD-10-CM

## 2022-08-18 DIAGNOSIS — H35.033: ICD-10-CM

## 2022-08-18 DIAGNOSIS — H35.722: ICD-10-CM

## 2022-08-18 DIAGNOSIS — H35.363: ICD-10-CM

## 2022-08-18 DIAGNOSIS — H43.813: ICD-10-CM

## 2022-08-18 DIAGNOSIS — H35.3122: ICD-10-CM

## 2022-08-18 PROCEDURE — 92250 FUNDUS PHOTOGRAPHY W/I&R: CPT

## 2022-08-18 PROCEDURE — J0178PRE EYLEA PREFILLED SYRINGE

## 2022-08-18 PROCEDURE — 67028 INJECTION EYE DRUG: CPT

## 2022-08-18 PROCEDURE — 99214 OFFICE O/P EST MOD 30 MIN: CPT

## 2022-08-18 ASSESSMENT — VISUAL ACUITY
OS_SC: 20/200
OD_SC: 20/200

## 2022-08-18 ASSESSMENT — TONOMETRY
OS_IOP_MMHG: 14
OD_IOP_MMHG: 15

## 2023-04-06 ENCOUNTER — COMPREHENSIVE EXAM (OUTPATIENT)
Dept: URBAN - METROPOLITAN AREA CLINIC 35 | Facility: CLINIC | Age: 88
End: 2023-04-06

## 2023-04-06 DIAGNOSIS — H35.3124: ICD-10-CM

## 2023-04-06 DIAGNOSIS — H35.731: ICD-10-CM

## 2023-04-06 DIAGNOSIS — H35.3211: ICD-10-CM

## 2023-04-06 DIAGNOSIS — H35.363: ICD-10-CM

## 2023-04-06 DIAGNOSIS — H35.722: ICD-10-CM

## 2023-04-06 DIAGNOSIS — H43.813: ICD-10-CM

## 2023-04-06 DIAGNOSIS — H35.30: ICD-10-CM

## 2023-04-06 DIAGNOSIS — H35.033: ICD-10-CM

## 2023-04-06 DIAGNOSIS — H04.123: ICD-10-CM

## 2023-04-06 DIAGNOSIS — H52.7: ICD-10-CM

## 2023-04-06 PROCEDURE — J0178PRE EYLEA PREFILLED SYRINGE

## 2023-04-06 PROCEDURE — J3490SYF SYFOVRE

## 2023-04-06 PROCEDURE — 67028 INJECTION EYE DRUG: CPT

## 2023-04-06 PROCEDURE — 99214 OFFICE O/P EST MOD 30 MIN: CPT

## 2023-04-06 PROCEDURE — 92250 FUNDUS PHOTOGRAPHY W/I&R: CPT

## 2023-04-06 ASSESSMENT — VISUAL ACUITY
OD_SC: CF 3FT
OS_SC: 20/400

## 2023-04-06 ASSESSMENT — TONOMETRY
OD_IOP_MMHG: 6
OS_IOP_MMHG: 12

## 2023-06-29 ENCOUNTER — COMPREHENSIVE EXAM (OUTPATIENT)
Dept: URBAN - METROPOLITAN AREA CLINIC 35 | Facility: CLINIC | Age: 88
End: 2023-06-29

## 2023-06-29 DIAGNOSIS — H35.3211: ICD-10-CM

## 2023-06-29 DIAGNOSIS — H35.3124: ICD-10-CM

## 2023-06-29 DIAGNOSIS — H35.033: ICD-10-CM

## 2023-06-29 DIAGNOSIS — H35.30: ICD-10-CM

## 2023-06-29 DIAGNOSIS — H35.722: ICD-10-CM

## 2023-06-29 DIAGNOSIS — H52.7: ICD-10-CM

## 2023-06-29 DIAGNOSIS — H35.363: ICD-10-CM

## 2023-06-29 DIAGNOSIS — H04.123: ICD-10-CM

## 2023-06-29 DIAGNOSIS — H35.731: ICD-10-CM

## 2023-06-29 PROCEDURE — 92250 FUNDUS PHOTOGRAPHY W/I&R: CPT

## 2023-06-29 PROCEDURE — 67028 INJECTION EYE DRUG: CPT

## 2023-06-29 PROCEDURE — J0178PRE EYLEA PREFILLED SYRINGE

## 2023-06-29 PROCEDURE — J3490SYF SYFOVRE

## 2023-06-29 PROCEDURE — 99213 OFFICE O/P EST LOW 20 MIN: CPT

## 2023-06-29 ASSESSMENT — TONOMETRY
OD_IOP_MMHG: 13
OS_IOP_MMHG: 12

## 2023-06-29 ASSESSMENT — VISUAL ACUITY
OS_SC: 20/200
OD_SC: 20/200

## 2023-08-31 ENCOUNTER — ESTABLISHED PATIENT (OUTPATIENT)
Dept: URBAN - METROPOLITAN AREA CLINIC 35 | Facility: CLINIC | Age: 88
End: 2023-08-31

## 2023-08-31 DIAGNOSIS — H35.3211: ICD-10-CM

## 2023-08-31 DIAGNOSIS — H35.033: ICD-10-CM

## 2023-08-31 DIAGNOSIS — H35.363: ICD-10-CM

## 2023-08-31 DIAGNOSIS — H35.30: ICD-10-CM

## 2023-08-31 DIAGNOSIS — H04.123: ICD-10-CM

## 2023-08-31 DIAGNOSIS — H35.731: ICD-10-CM

## 2023-08-31 DIAGNOSIS — H35.3124: ICD-10-CM

## 2023-08-31 PROCEDURE — J0178PRE EYLEA PREFILLED SYRINGE

## 2023-08-31 PROCEDURE — 67028 INJECTION EYE DRUG: CPT

## 2023-08-31 PROCEDURE — 92250 FUNDUS PHOTOGRAPHY W/I&R: CPT

## 2023-08-31 PROCEDURE — 99213 OFFICE O/P EST LOW 20 MIN: CPT

## 2023-08-31 ASSESSMENT — TONOMETRY
OS_IOP_MMHG: 12
OD_IOP_MMHG: 12

## 2023-08-31 ASSESSMENT — VISUAL ACUITY
OS_SC: 20/200
OD_SC: 20/100
